# Patient Record
Sex: MALE
[De-identification: names, ages, dates, MRNs, and addresses within clinical notes are randomized per-mention and may not be internally consistent; named-entity substitution may affect disease eponyms.]

---

## 2019-08-02 ENCOUNTER — APPOINTMENT (OUTPATIENT)
Dept: OTOLARYNGOLOGY | Facility: CLINIC | Age: 79
End: 2019-08-02
Payer: SELF-PAY

## 2019-08-02 PROBLEM — Z00.00 ENCOUNTER FOR PREVENTIVE HEALTH EXAMINATION: Status: ACTIVE | Noted: 2019-08-02

## 2019-08-02 PROCEDURE — 92592: CPT | Mod: NC

## 2019-08-22 ENCOUNTER — APPOINTMENT (OUTPATIENT)
Dept: OTOLARYNGOLOGY | Facility: CLINIC | Age: 79
End: 2019-08-22
Payer: SELF-PAY

## 2019-08-22 PROCEDURE — V5299C: CUSTOM

## 2019-08-29 ENCOUNTER — APPOINTMENT (OUTPATIENT)
Dept: OTOLARYNGOLOGY | Facility: CLINIC | Age: 79
End: 2019-08-29

## 2019-09-05 ENCOUNTER — APPOINTMENT (OUTPATIENT)
Dept: OTOLARYNGOLOGY | Facility: CLINIC | Age: 79
End: 2019-09-05
Payer: MEDICARE

## 2019-09-05 DIAGNOSIS — H90.5 UNSPECIFIED SENSORINEURAL HEARING LOSS: ICD-10-CM

## 2019-09-05 DIAGNOSIS — J32.4 CHRONIC PANSINUSITIS: ICD-10-CM

## 2019-09-05 DIAGNOSIS — R43.0 ANOSMIA: ICD-10-CM

## 2019-09-05 PROCEDURE — 92567 TYMPANOMETRY: CPT

## 2019-09-05 PROCEDURE — 99213 OFFICE O/P EST LOW 20 MIN: CPT | Mod: 25

## 2019-09-05 PROCEDURE — 92557 COMPREHENSIVE HEARING TEST: CPT

## 2019-09-05 PROCEDURE — 31231 NASAL ENDOSCOPY DX: CPT

## 2019-09-09 PROBLEM — R43.0 ANOSMIA: Status: ACTIVE | Noted: 2019-09-09

## 2019-09-09 PROBLEM — H90.5 SNHL (SENSORY-NEURAL HEARING LOSS), ASYMMETRICAL: Status: ACTIVE | Noted: 2019-09-05

## 2019-09-09 PROBLEM — J32.4 CHRONIC PANSINUSITIS: Status: ACTIVE | Noted: 2019-09-05

## 2019-09-09 NOTE — ASSESSMENT
[FreeTextEntry1] : #1-idiopathic anosmia. He will be sent for imaging of the paranasal sinuses. 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 Slight asymmetry. This has not changed from his audiogram for 3 years ago. He is not motivated to workup or retrocochlear lesion.

## 2022-01-01 NOTE — HISTORY OF PRESENT ILLNESS
[de-identified] : This gentleman presents to me today. He does not speak very much English. We called his wife and put on speaker phone. I evaluated him in the past when he complained of decreased sense of smell. It is difficult to decide by history whether the sense of smell is gone worse or it stayed the same and is seeking the same evaluation. He is not having any classic sinus type symptoms.\par \par Additionally he reports hearing loss. He feels that this is somewhat stable. This is generally worse in his right ear.
no anomalies noted